# Patient Record
Sex: MALE | Race: WHITE | Employment: OTHER
[De-identification: names, ages, dates, MRNs, and addresses within clinical notes are randomized per-mention and may not be internally consistent; named-entity substitution may affect disease eponyms.]

---

## 2022-08-23 PROBLEM — B35.4 TINEA CORPORIS: Status: ACTIVE | Noted: 2017-10-05

## 2022-08-23 PROBLEM — M71.21 BAKER'S CYST OF KNEE, RIGHT: Status: ACTIVE | Noted: 2017-10-09

## 2022-08-23 PROBLEM — B37.2 CANDIDIASIS, INTERTRIGO: Status: ACTIVE | Noted: 2018-09-20

## 2022-08-23 PROBLEM — K42.9 UMBILICAL HERNIA WITHOUT OBSTRUCTION AND WITHOUT GANGRENE: Status: ACTIVE | Noted: 2018-11-06

## 2024-04-01 ENCOUNTER — HOSPITAL ENCOUNTER (OUTPATIENT)
Facility: HOSPITAL | Age: 79
Setting detail: RECURRING SERIES
Discharge: HOME | End: 2024-04-04
Payer: MEDICARE

## 2024-04-01 PROCEDURE — 97162 PT EVAL MOD COMPLEX 30 MIN: CPT

## 2024-04-01 PROCEDURE — 97110 THERAPEUTIC EXERCISES: CPT

## 2024-04-01 PROCEDURE — 97140 MANUAL THERAPY 1/> REGIONS: CPT

## 2024-04-01 NOTE — PROGRESS NOTES
[facility]  [address], [facility], [address] Phone: [phone] Fax [phone]  Plan of Care / Statement of Necessity for Physical Therapy Services     Patient Name: Michael Hernandez : 1945   Medical   Diagnosis: Other low back pain [M54.59] Treatment Diagnosis: M54.59  OTHER LOWER BACK PAIN     Onset Date: Worsening over past year Payor   Payor: MEDICARE / Plan: MEDICARE PART A AND B / Product Type: *No Product type* /    Referral Source: Ney Guevara DO Start of Care (SOC): 2024   Prior Hospitalization: See medical history Provider #: [id number]   Prior Level of Function: Functionally independent, lives with spouse and three dogs, retired practice manager, active   Comorbidities: allergies, arthritis, back pain, congestive heart failure/heart disease, high blood pressure, kidney stones, spasmodic torticollis      Assessment / key information:  Patient is a pleasant 78 year old Right handed male who presents with complaints of worsening low back pain that radiates into the Left buttocks. He reports that pain has been progressing over the past 1-1.5 years without specific injury. He has been diagnosed with levoscoliosis, and is awaiting MRI. Currently pain is worse with standing and walking, and relieved with sitting and supine. Patient is very active in garden, and is diligent about performing home stretches/HEP daily. At evaluation Patient presents with the following objective measures:    Objective Information/Functional Measures/Assessment:  Lumbar AROM: flexion WNL                          Extension WNL                          Rotation Right 50%, Left 50%                          Side bending Right WNL, Left WNL with pain increase     Knee strength: quads 4+/5, hamstrings 5/5 bilaterally      Hip strength: flexion Right 4+/5, Left 5/5                          Abduction Right 4/5, Left 4/5

## 2024-04-01 NOTE — PROGRESS NOTES
PHYSICAL / OCCUPATIONAL THERAPY - DAILY TREATMENT NOTE (updated )  For Eval visit    Patient Name: Michael Hernandez    Date: 2024    : 1945  Insurance: Payor: MEDICARE / Plan: MEDICARE PART A AND B / Product Type: *No Product type* /      Patient  verified yes     Visit #   Current / Total 1 24   Time   In / Out 9:03 9:46   Total Treatment  Time  43   Pain   In / Out 3/10 3/10   Subjective Functional Status/Changes: See below     NEXT PROGRESS NOTE DUE: 2024    TREATMENT AREA =  Other low back pain [M54.59]    SUBJECTIVE:  Patient is a pleasant 78 year old Right handed male who presents with complaints of worsening low back pain that radiates into the Left buttocks. He reports that pain has been progressing over the past 1-1.5 years without specific injury. He has been diagnosed with levoscoliosis, and is awaiting MRI. Currently pain is worse with standing and walking, and relieved with sitting and supine. Patient is very active in garden, and is diligent about performing home stretches/HEP daily.     Co-morbidities: allergies, arthritis, back pain, congestive heart failure/heart disease, high blood pressure, kidney stones    Allergies: pollen    OBJECTIVE    Modalities Rationale:     decrease inflammation, decrease pain, and increase tissue extensibility to improve patient's ability to progress to PLOF and address remaining functional goals.     min [] Estim Unattended, type/location:                                      []  w/ice    []  w/heat    min [] Estim Attended, type/location:                                     []  w/US     []  w/ice    []  w/heat    []  TENS insruct      min []  Mechanical Traction: type/lbs                   []  pro   []  sup   []  int   []  cont    []  before manual    []  after manual    min []  Ultrasound, settings/location:      min []  Iontophoresis w/ dexamethasone, location:                                               []  take home patch       []  in

## 2024-04-03 ENCOUNTER — HOSPITAL ENCOUNTER (OUTPATIENT)
Facility: HOSPITAL | Age: 79
Setting detail: RECURRING SERIES
Discharge: HOME | End: 2024-04-06
Payer: MEDICARE

## 2024-04-03 PROCEDURE — 97112 NEUROMUSCULAR REEDUCATION: CPT

## 2024-04-03 PROCEDURE — 97140 MANUAL THERAPY 1/> REGIONS: CPT

## 2024-04-03 PROCEDURE — 97530 THERAPEUTIC ACTIVITIES: CPT

## 2024-04-03 NOTE — PROGRESS NOTES
rotation AROM to at least 75% of full, without pain increase, in order to improve ease of ADLs.  Status at IE grossly 50% of full, pain with Left rotation   4.  Patient will be able to perform supine bridge to at least 75% of full in order to improve ease of bed mobility.  Status at IE 50%    PLAN  [x]  Continue plan of care  []  Upgrade activities as tolerated  []  Discharge due to :  []  Other:    Minnie Marin PT    4/3/2024    1:08 PM    Future Appointments   Date Time Provider Department Center   4/3/2024  3:20 PM Minnie Marin, PT [facility]   4/8/2024  9:40 AM Minnie Marin A, PT [facility]   4/17/2024  9:40 AM Minnie Marin, PT [facility]   4/22/2024 11:40 AM Minnie Marin A, PT [facility]   4/24/2024  9:40 AM Minnie Marin, PT [facility]   4/29/2024  9:40 AM Minnie Marin, PT [facility]   5/1/2024  9:40 AM Minnie Marin A, PT [facility]   5/6/2024  9:40 AM Minnie Marin A, PT [facility]   5/8/2024  9:40 AM Minnie Marin A, PT [facility]   6/6/2024  7:30 AM [facility] [place] Sched   6/13/2024  8:40 AM Petra Pearson, PACaroC [facility] [place] Sched

## 2024-04-08 ENCOUNTER — HOSPITAL ENCOUNTER (OUTPATIENT)
Facility: HOSPITAL | Age: 79
Setting detail: RECURRING SERIES
Discharge: HOME | End: 2024-04-11
Payer: MEDICARE

## 2024-04-08 PROCEDURE — 97140 MANUAL THERAPY 1/> REGIONS: CPT

## 2024-04-08 PROCEDURE — 97112 NEUROMUSCULAR REEDUCATION: CPT

## 2024-04-08 PROCEDURE — 97530 THERAPEUTIC ACTIVITIES: CPT

## 2024-04-08 NOTE — PROGRESS NOTES
progressing, has initiated 04/08/2024  2.  Patient will improve bilateral hip abduction and extension strength to 4+/5 in order to reduce lumbar strain with transfers and mobility.  Status at IE 4/5  3.  Patient will improve bilateral lumbar rotation AROM to at least 75% of full, without pain increase, in order to improve ease of ADLs.  Status at IE grossly 50% of full, pain with Left rotation   4.  Patient will be able to perform supine bridge to at least 75% of full in order to improve ease of bed mobility.  Status at IE 50%    PLAN  [x]  Continue plan of care  [x]  Upgrade activities as tolerated  []  Discharge due to :  []  Other:    Minnie Marin PT    4/8/2024    9:20 AM    Future Appointments   Date Time Provider Department Center   4/8/2024  9:40 AM Minnie Marin, PT [facility]   4/17/2024  9:40 AM Minnie Marin, PT [facility]   4/22/2024 11:40 AM Minnie Marin, PT [facility]   4/24/2024  9:40 AM Minnie Marin, PT [facility]   4/29/2024  9:40 AM Minnie Marin, PT [facility]   5/1/2024  9:40 AM Minnie Marin, PT [facility]   5/6/2024  9:40 AM Minnie Marin, PT [facility]   5/8/2024  9:40 AM Minnie Marin, PT [facility]   6/6/2024  7:30 AM [facility] [place] Sched   6/13/2024  8:40 AM Petra Pearson, ESSIE [facility] [place] Sched

## 2024-04-10 ENCOUNTER — HOSPITAL ENCOUNTER (OUTPATIENT)
Facility: HOSPITAL | Age: 79
Setting detail: RECURRING SERIES
Discharge: HOME | End: 2024-04-13
Payer: MEDICARE

## 2024-04-10 PROCEDURE — 97530 THERAPEUTIC ACTIVITIES: CPT

## 2024-04-10 PROCEDURE — 97140 MANUAL THERAPY 1/> REGIONS: CPT

## 2024-04-10 NOTE — PROGRESS NOTES
PHYSICAL / OCCUPATIONAL THERAPY - DAILY TREATMENT NOTE (updated )    Patient Name: Michael Hernandez    Date: 4/10/2024    : 1945  Insurance: Payor: MEDICARE / Plan: MEDICARE PART A AND B / Product Type: *No Product type* /      Patient  verified yes     Visit #   Current / Total 4 24   Time   In / Out 9:00 9:53    Total Treatment Time 53   Pain   In / Out 3-4/10 2/10   Subjective Functional Status/Changes: It's pretty much the same.      NEXT PROGRESS NOTE DUE: 2024    TREATMENT AREA =  Other low back pain [M54.59]    OBJECTIVE    Modalities Rationale:     decrease pain and increase tissue extensibility to improve patient's ability to progress to PLOF and address remaining functional goals.     min [] Estim Unattended, type/location:                                      []  w/ice    []  w/heat    min [] Estim Attended, type/location:                                     []  w/US     []  w/ice    []  w/heat    []  TENS insruct      min []  Mechanical Traction: type/lbs                   []  pro   []  sup   []  int   []  cont    []  before manual    []  after manual    min []  Ultrasound, settings/location:      min []  Iontophoresis w/ dexamethasone, location:                                               []  take home patch       []  in clinic   10 min  unbilled []  Ice     [x]  Heat    location/position: Right sidelying over half foam with pillow to Left lower back/hip    min []  Paraffin,  details:     min []  Vasopneumatic Device, press/temp:     min []  Whirlpool / Fluido:    If using vaso (only need to measure limb vaso being performed on)      pre-treatment girth :       post-treatment girth :       measured at (landmark location) :      min []  Other:    Skin assessment post-treatment (if applicable):    []  intact    []  redness- no adverse reaction                 []redness - adverse reaction:      Vasopnuematic compression justification:  Per bilateral girth measures taken and listed

## 2024-04-15 ENCOUNTER — HOSPITAL ENCOUNTER (OUTPATIENT)
Facility: HOSPITAL | Age: 79
Setting detail: RECURRING SERIES
Discharge: HOME | End: 2024-04-18
Payer: MEDICARE

## 2024-04-15 PROCEDURE — 97112 NEUROMUSCULAR REEDUCATION: CPT | Performed by: PHYSICAL THERAPIST

## 2024-04-15 PROCEDURE — 97530 THERAPEUTIC ACTIVITIES: CPT | Performed by: PHYSICAL THERAPIST

## 2024-04-15 NOTE — PROGRESS NOTES
justification:  Per bilateral girth measures taken and listed above the edema is considered significant and having an impact on the patient's strength, balance, gait, transfers, self care, and ADL's     Therapeutic Procedures:  Tx Min Billable or 1:1 Min (if diff from Tx Min) Procedure, Rationale, Specifics   10 0 [id number] Therapeutic Exercise (timed):  increase ROM, strength, coordination, balance, and proprioception to improve patient's ability to progress to PLOF and address remaining functional goals. (see flow sheet as applicable)     Details if applicable:    Bike  Incline stretch  Hamstring stretch       28 [phone] [id number] Therapeutic Activity (timed):  use of dynamic activities replicating functional movements to increase ROM, strength, coordination, balance, and proprioception in order to improve patient's ability to progress to PLOF and address remaining functional goals.  (see flow sheet as applicable)     Details if applicable:    Standing hip extension with knee flexion OTB  Standing clams with GTB  Bridge  Sit to stand with OTB   15 15 [id number] Neuromuscular Re-Education (timed):  improve balance, coordination, kinesthetic sense, posture, core stability and proprioception to improve patient's ability to develop conscious control of individual muscles and awareness of position of extremities in order to progress to PLOF and address remaining functional goals. (see flow sheet as applicable)     Details if applicable:    PPT   PPT with band/ball  BKFO  Seated PPT with march     33510 Manual Therapy (timed):  decrease pain, increase ROM, increase tissue extensibility, and decrease trigger points to improve patient's ability to progress to PLOF and address remaining functional goals.  The manual therapy interventions were performed at a separate and distinct time from the therapeutic activities interventions . (see flow sheet as applicable)     Details if applicable:    Prone STM/DTM to Left QL/glutes, PA mobs to

## 2024-04-17 ENCOUNTER — HOSPITAL ENCOUNTER (OUTPATIENT)
Facility: HOSPITAL | Age: 79
Setting detail: RECURRING SERIES
Discharge: HOME | End: 2024-04-20
Payer: MEDICARE

## 2024-04-17 PROCEDURE — 97112 NEUROMUSCULAR REEDUCATION: CPT

## 2024-04-17 PROCEDURE — 97110 THERAPEUTIC EXERCISES: CPT

## 2024-04-17 PROCEDURE — 97140 MANUAL THERAPY 1/> REGIONS: CPT

## 2024-04-17 NOTE — PROGRESS NOTES
PHYSICAL / OCCUPATIONAL THERAPY - DAILY TREATMENT NOTE (updated )    Patient Name: Michael Hernandez    Date: 2024    : 1945  Insurance: Payor: MEDICARE / Plan: MEDICARE PART A AND B / Product Type: *No Product type* /      Patient  verified yes     Visit #   Current / Total 6 24   Time   In / Out 9:40 10:26    Total Treatment Time 46   Pain   In / Out 4/10 2/10   Subjective Functional Status/Changes: It's still about the same.      NEXT PROGRESS NOTE DUE: 2024    TREATMENT AREA =  Other low back pain [M54.59]    OBJECTIVE    Modalities Rationale:     decrease pain and increase tissue extensibility to improve patient's ability to progress to PLOF and address remaining functional goals.     min [] Estim Unattended, type/location:                                      []  w/ice    []  w/heat    min [] Estim Attended, type/location:                                     []  w/US     []  w/ice    []  w/heat    []  TENS insruct      min []  Mechanical Traction: type/lbs                   []  pro   []  sup   []  int   []  cont    []  before manual    []  after manual    min []  Ultrasound, settings/location:      min []  Iontophoresis w/ dexamethasone, location:                                               []  take home patch       []  in clinic   PD min  unbilled []  Ice     []  Heat    location/position:     min []  Paraffin,  details:     min []  Vasopneumatic Device, press/temp:     min []  Whirlpool / Fluido:    If using vaso (only need to measure limb vaso being performed on)      pre-treatment girth :       post-treatment girth :       measured at (landmark location) :      min []  Other:    Skin assessment post-treatment (if applicable):    []  intact    []  redness- no adverse reaction                 []redness - adverse reaction:      Vasopnuematic compression justification:  Per bilateral girth measures taken and listed above the edema is considered significant and having an impact on the

## 2024-04-22 ENCOUNTER — HOSPITAL ENCOUNTER (OUTPATIENT)
Facility: HOSPITAL | Age: 79
Setting detail: RECURRING SERIES
Discharge: HOME | End: 2024-04-25
Payer: MEDICARE

## 2024-04-22 PROCEDURE — 97140 MANUAL THERAPY 1/> REGIONS: CPT

## 2024-04-22 PROCEDURE — 97112 NEUROMUSCULAR REEDUCATION: CPT

## 2024-04-22 PROCEDURE — 97530 THERAPEUTIC ACTIVITIES: CPT

## 2024-04-22 NOTE — PROGRESS NOTES
QL/lumbar paraspinals, manual Left QL stretch          Details if applicable:     51 45 [facility] Totals Reminder: bill using total billable min of TIMED therapeutic procedures (example: do not include dry needle or estim unattended, both untimed codes, in totals to left)  8-22 min = 1 unit; 23-37 min = 2 units; 38-52 min = 3 units; 53-67 min = 4 units; 68-82 min = 5 units   Total Total     [x]  Patient Education billed concurrently with other procedures   [x] Review HEP    [] Progressed/Changed HEP, detail:    [] Other detail:       Objective Information/Functional Measures/Assessment:  -Patient notes overall less back pain yesterday, but also admits to more periods of sitting during the day  -Bilateral hip abduction strength 4+/5, extension 4/5  -added wall sits on swiss ball for LE, posturing strengthening and balance; cueing to prevent anterior translation of knees over toes  -fatigue with standing clams, good form  -improving mobility noted with manual Left QL stretching in prone    Patient will continue to benefit from skilled PT / OT services to modify and progress therapeutic interventions, analyze and address functional mobility deficits, analyze and address ROM deficits, analyze and address strength deficits, analyze and address soft tissue restrictions, analyze and cue for proper movement patterns, analyze and modify for postural abnormalities, analyze and address imbalance/dizziness, and instruct in home and community integration to address functional deficits and attain remaining goals.    Progress toward goals / Updated goals:  []  See Progress Note/Recertification    Short Term Goals: To be accomplished in 10 treatments:   Patient will be independent and compliant with HEP in order to progress toward long term goals.  Status at  issued and reviewed  Current status: progressing, has initiated 04/08/2024  2.  Patient will improve bilateral hip abduction and extension strength to 4+/5 in order to reduce

## 2024-04-24 ENCOUNTER — HOSPITAL ENCOUNTER (OUTPATIENT)
Facility: HOSPITAL | Age: 79
Setting detail: RECURRING SERIES
Discharge: HOME | End: 2024-04-27
Payer: MEDICARE

## 2024-04-24 PROCEDURE — 97112 NEUROMUSCULAR REEDUCATION: CPT

## 2024-04-24 PROCEDURE — 97530 THERAPEUTIC ACTIVITIES: CPT

## 2024-04-24 PROCEDURE — 97140 MANUAL THERAPY 1/> REGIONS: CPT

## 2024-04-24 NOTE — PROGRESS NOTES
compliant with HEP in order to progress toward long term goals.  Status at IE issued and reviewed  Current status: progressing, has initiated 04/08/2024  2.  Patient will improve bilateral hip abduction and extension strength to 4+/5 in order to reduce lumbar strain with transfers and mobility.  Status at IE 4/5  Current status: progressing, abduction 4+/5 04/22/2024  3.  Patient will improve bilateral lumbar rotation AROM to at least 75% of full, without pain increase, in order to improve ease of ADLs.  Status at IE grossly 50% of full, pain with Left rotation   4.  Patient will be able to perform supine bridge to at least 75% of full in order to improve ease of bed mobility.  Status at IE 50%  Current status: progressing, 50% initially increasing to 75% 04/10/2024    PLAN  [x]  Continue plan of care  [x]  Upgrade activities as tolerated  []  Discharge due to :  []  Other:    Minnie Marin PT    4/24/2024    9:16 AM    Future Appointments   Date Time Provider Department Center   4/24/2024  9:40 AM [facility]   4/29/2024  9:40 AM Minnie Marin, PT [facility]   5/1/2024  9:40 AM [facility]   5/6/2024  9:40 AM [facility]   5/8/2024  9:40 AM Minnie Marin, PT [facility]   6/6/2024  7:30 AM [facility] [place] Sched   6/13/2024  8:40 AM Petra Pearson, ESSIE [facility] [place] Sched

## 2024-04-29 ENCOUNTER — HOSPITAL ENCOUNTER (OUTPATIENT)
Facility: HOSPITAL | Age: 79
Setting detail: RECURRING SERIES
Discharge: HOME | End: 2024-05-02
Payer: MEDICARE

## 2024-04-29 PROCEDURE — 97140 MANUAL THERAPY 1/> REGIONS: CPT

## 2024-04-29 PROCEDURE — 97530 THERAPEUTIC ACTIVITIES: CPT

## 2024-04-29 PROCEDURE — 97110 THERAPEUTIC EXERCISES: CPT

## 2024-04-29 NOTE — PROGRESS NOTES
[facility]  [address] [place] [address]  Phone: [phone]   [phone]  PHYSICAL THERAPY PROGRESS NOTE  Patient Name: Michael Hernandez : 1945   Treatment/Medical Diagnosis: Other low back pain [M54.59]   Referral Source: Ney Guevara DO     Date of Initial Visit: 2024 Attended Visits: 9 Missed Visits: 0     SUMMARY OF TREATMENT  Patient is a pleasant 78 year old male who has attended therapy over the past month for low back pain. Patient was diagnosed with levoscoliosis, and presents with Right LE longer than left; he was issued a heel lift for Left shoe at evaluation and notes no adverse effects. Currently Patient reports low back pain that radiates into the Left LE that is worse at the end of the day after activities. If he is able to support his trunk with a cart or cane he notes less pain. As pain increases, his trunk side bends to the Left, but if he is able to hold weight in his Right hand, he notes less bending and less pain. At reassessment today Patient stands with obvious scoliotic curvature to Left, and presents with hip weakness bilaterally, Right more than Left, that likely contributes to inability to maintain neutral spine with activities. Significant tenderness and tightness in Left quadratus lumborum and gluteal musculature. He had an MRI on 2024, and will follow up with DO next week. Patient will benefit from continued skilled physical therapy to address remaining deficits.     CURRENT STATUS  Hip strength: abduction Right 4-/5 Left 4/5, extension Right 4-/5 Left 4/5  Lumbar AROM: rotation 75% of full bilaterally, no pain increase  FOTO: 67/100 pts  Functional Gains: able to go longer with less pain after therapy sessions, able to walk with less pain when walking with cart at store or when using cane, less discomfort in low back when holding something heavy on Right  Functional Deficits: 
sheet as applicable)     Details if applicable:    Supine Left leg pull/distraction, Right sidelying over pillow - Manual Left QL stretching, STM/DTM to Left QL           Details if applicable:     41 39 [facility] Totals Reminder: bill using total billable min of TIMED therapeutic procedures (example: do not include dry needle or estim unattended, both untimed codes, in totals to left)  8-22 min = 1 unit; 23-37 min = 2 units; 38-52 min = 3 units; 53-67 min = 4 units; 68-82 min = 5 units   Total Total     [x]  Patient Education billed concurrently with other procedures   [x] Review HEP    [] Progressed/Changed HEP, detail:    [] Other detail:       Objective Information/Functional Measures/Assessment:  Hip strength: abduction Right 4-/5 Left 4/5, extension Right 4-/5 Left 4/5  Lumbar AROM: rotation 75% of full bilaterally, no pain increase  FOTO: 67/100 pts  Functional Gains: able to go longer with less pain after therapy sessions, able to walk with less pain when walking with cart at store or when using cane, less discomfort in low back when holding something heavy on Right  Functional Deficits: pain with activity/pain worsening as day progresses, increasing Left trunk side bending with standing/walking during day, radicular symptoms in Left LE worsening throughout day, tightness in low back at end of day (relieved with lying supine)  % improvement: 40%  Pain   Average: 4-5/10       Best: 0/10 (supine or sitting)     Worst: 7/10  Patient Goal: \"to find out what's going on\"     Assessment: Patient is a pleasant 78 year old male who has attended therapy over the past month for low back pain. Patient was diagnosed with levoscoliosis, and presents with Right LE longer than left; he was issued a heel lift for Left shoe at evaluation and notes no adverse effects. Currently Patient reports low back pain that radiates into the Left LE that is worse at the end of the day after activities. If he is able to support his trunk with

## 2024-05-01 ENCOUNTER — HOSPITAL ENCOUNTER (OUTPATIENT)
Facility: HOSPITAL | Age: 79
Setting detail: RECURRING SERIES
Discharge: HOME | End: 2024-05-04
Payer: MEDICARE

## 2024-05-01 PROCEDURE — 97530 THERAPEUTIC ACTIVITIES: CPT

## 2024-05-01 PROCEDURE — 97110 THERAPEUTIC EXERCISES: CPT

## 2024-05-01 PROCEDURE — 97140 MANUAL THERAPY 1/> REGIONS: CPT

## 2024-05-01 NOTE — PROGRESS NOTES
functional deficits and attain remaining goals.    Progress toward goals / Updated goals:  []  See Progress Note/Recertification    LONG-TERM GOALS TO BE ACHIEVED IN 15 remaining treatments    Patient will improve FOTO assessment score to 66/100 pts in order to indicate improved functional abilities.   Status at IE 61/100 pts  2.  Patient will improve bilateral knee extension and hip strength to 5/5 throughout in order to reduce low back strain with transfers and mobility.   Status at IE knee extension 4+/5 bilaterally, flexion Right 4+/5, Left 5/5; Abduction Right 4/5, Left 4/5; Extension Right 4/5, Left 4/5 with pain; External rotation Right 5/5, Left 5/5; Internal rotation Right 4+/5, Left 5/5  3.  Patient will improve supine bridge to full ROM in order to indicate improved gluteal engagement with mobility.   Status at IE 50% bridge  4.  Patient will report worst low back, Left buttocks pain as 2/10 or less in order to improve standing and walking tolerance.   Status at IE 5/10    PLAN  [x]  Continue plan of care  []  Upgrade activities as tolerated  []  Discharge due to :  []  Other:    Minnie Marin PT    5/1/2024    8:06 AM    Future Appointments   Date Time Provider Department Center   5/1/2024  8:20 AM [facility]   5/6/2024  9:40 AM Minnie Marin, PT [facility]   5/8/2024  9:40 AM Minnie Marin, PT [facility]   5/13/2024  9:00 AM Minnie Marin, PT [facility]   5/20/2024 10:20 AM Minnie Marin, PT [facility]   5/22/2024  9:40 AM Minnie Marin, PT [facility]   5/29/2024  9:40 AM Minnie Marin, PT [facility]   6/6/2024  7:30 AM [facility] [place] Sched   6/13/2024  8:40 AM Petra Pearson, PA-C [facility] [place] Sched

## 2024-05-06 ENCOUNTER — HOSPITAL ENCOUNTER (OUTPATIENT)
Facility: HOSPITAL | Age: 79
Setting detail: RECURRING SERIES
Discharge: HOME | End: 2024-05-09
Payer: MEDICARE

## 2024-05-06 PROCEDURE — 97140 MANUAL THERAPY 1/> REGIONS: CPT

## 2024-05-06 PROCEDURE — 97110 THERAPEUTIC EXERCISES: CPT

## 2024-05-06 PROCEDURE — 97530 THERAPEUTIC ACTIVITIES: CPT

## 2024-05-06 NOTE — PROGRESS NOTES
goals.  The manual therapy interventions were performed at a separate and distinct time from the therapeutic activities interventions . (see flow sheet as applicable)     Details if applicable:    Right sidelying over pillow - Manual Left QL stretching, STM/DTM to Left QL, piriformis/gluts           Details if applicable:     45 45 MC BC Totals Reminder: bill using total billable min of TIMED therapeutic procedures (example: do not include dry needle or estim unattended, both untimed codes, in totals to left)  8-22 min = 1 unit; 23-37 min = 2 units; 38-52 min = 3 units; 53-67 min = 4 units; 68-82 min = 5 units   Total Total     [x]  Patient Education billed concurrently with other procedures   [x] Review HEP    [] Progressed/Changed HEP, detail:    [] Other detail:       Objective Information/Functional Measures/Assessment:  -Focused on hip strengthening due to Right hip weakness leading to compensations  -patient notes overall continued pain, up to 5/10, at end of day/after activities  -MD follow up tomorrow to review recent MRI; review results at next visit    Patient will continue to benefit from skilled PT / OT services to modify and progress therapeutic interventions, analyze and address functional mobility deficits, analyze and address ROM deficits, analyze and address strength deficits, analyze and address soft tissue restrictions, analyze and cue for proper movement patterns, analyze and modify for postural abnormalities, analyze and address imbalance/dizziness, and instruct in home and community integration to address functional deficits and attain remaining goals.    Progress toward goals / Updated goals:  []  See Progress Note/Recertification    LONG-TERM GOALS TO BE ACHIEVED IN 15 remaining treatments    Patient will improve FOTO assessment score to 66/100 pts in order to indicate improved functional abilities.   Status at IE 61/100 pts  Current status:  2.  Patient will improve bilateral knee extension

## 2024-05-08 ENCOUNTER — HOSPITAL ENCOUNTER (OUTPATIENT)
Facility: HOSPITAL | Age: 79
Setting detail: RECURRING SERIES
Discharge: HOME | End: 2024-05-11
Payer: MEDICARE

## 2024-05-08 PROCEDURE — 97140 MANUAL THERAPY 1/> REGIONS: CPT

## 2024-05-08 PROCEDURE — 97530 THERAPEUTIC ACTIVITIES: CPT

## 2024-05-08 PROCEDURE — 97110 THERAPEUTIC EXERCISES: CPT

## 2024-05-08 NOTE — PROGRESS NOTES
PHYSICAL / OCCUPATIONAL THERAPY - DAILY TREATMENT NOTE (updated )    Patient Name: Michael Hernandez    Date: 2024    : 1945  Insurance: Payor: MEDICARE / Plan: MEDICARE PART A AND B / Product Type: *No Product type* /      Patient  verified yes     Visit #   Current / Total 12 24   Time   In / Out 9:40 10:21    Total Treatment Time 41   Pain   In / Out 3.5/10 2/10   Subjective Functional Status/Changes: I'm going for an epidural this afternoon.      NEXT PROGRESS NOTE DUE: 2024    TREATMENT AREA =  Other low back pain [M54.59]    OBJECTIVE    Modalities Rationale:     decrease pain and increase tissue extensibility to improve patient's ability to progress to PLOF and address remaining functional goals.     min [] Estim Unattended, type/location:                                      []  w/ice    []  w/heat    min [] Estim Attended, type/location:                                     []  w/US     []  w/ice    []  w/heat    []  TENS insruct      min []  Mechanical Traction: type/lbs                   []  pro   []  sup   []  int   []  cont    []  before manual    []  after manual    min []  Ultrasound, settings/location:      min []  Iontophoresis w/ dexamethasone, location:                                               []  take home patch       []  in clinic   PD min  unbilled []  Ice     []  Heat    location/position:     min []  Paraffin,  details:     min []  Vasopneumatic Device, press/temp:     min []  Whirlpool / Fluido:    If using vaso (only need to measure limb vaso being performed on)      pre-treatment girth :       post-treatment girth :       measured at (landmark location) :      min []  Other:    Skin assessment post-treatment (if applicable):    []  intact    []  redness- no adverse reaction                 []redness - adverse reaction:      Vasopnuematic compression justification:  Per bilateral girth measures taken and listed above the edema is considered significant and having

## 2024-05-13 ENCOUNTER — HOSPITAL ENCOUNTER (OUTPATIENT)
Facility: HOSPITAL | Age: 79
Setting detail: RECURRING SERIES
Discharge: HOME | End: 2024-05-16
Payer: MEDICARE

## 2024-05-13 PROCEDURE — 97140 MANUAL THERAPY 1/> REGIONS: CPT

## 2024-05-13 PROCEDURE — 97530 THERAPEUTIC ACTIVITIES: CPT

## 2024-05-13 PROCEDURE — 97110 THERAPEUTIC EXERCISES: CPT

## 2024-05-13 NOTE — PROGRESS NOTES
Details if applicable:    Right sidelying over pillow - Manual Left QL stretching, STM/DTM to Left QL, piriformis/gluts           Details if applicable:     42 42 MC BC Totals Reminder: bill using total billable min of TIMED therapeutic procedures (example: do not include dry needle or estim unattended, both untimed codes, in totals to left)  8-22 min = 1 unit; 23-37 min = 2 units; 38-52 min = 3 units; 53-67 min = 4 units; 68-82 min = 5 units   Total Total     [x]  Patient Education billed concurrently with other procedures   [x] Review HEP    [] Progressed/Changed HEP, detail:    [] Other detail:       Objective Information/Functional Measures/Assessment:  -Patient received injections last week following PT session; he states that injections contained dexamethasone, and since injections he does note less Left sciatic pain  -added Total Gym for LE strengthening to support spine  -remains tender to pressure at Left QL origin during manual     Patient will continue to benefit from skilled PT / OT services to modify and progress therapeutic interventions, analyze and address functional mobility deficits, analyze and address ROM deficits, analyze and address strength deficits, analyze and address soft tissue restrictions, analyze and cue for proper movement patterns, analyze and modify for postural abnormalities, analyze and address imbalance/dizziness, and instruct in home and community integration to address functional deficits and attain remaining goals.    Progress toward goals / Updated goals:  []  See Progress Note/Recertification    LONG-TERM GOALS TO BE ACHIEVED IN 15 remaining treatments    Patient will improve FOTO assessment score to 66/100 pts in order to indicate improved functional abilities.   Status at IE 61/100 pts  Current status:  2.  Patient will improve bilateral knee extension and hip strength to 5/5 throughout in order to reduce low back strain with transfers and mobility.   Status at IE knee

## 2024-05-15 ENCOUNTER — HOSPITAL ENCOUNTER (OUTPATIENT)
Facility: HOSPITAL | Age: 79
Setting detail: RECURRING SERIES
Discharge: HOME | End: 2024-05-18
Payer: MEDICARE

## 2024-05-15 PROCEDURE — 97530 THERAPEUTIC ACTIVITIES: CPT

## 2024-05-15 PROCEDURE — 97140 MANUAL THERAPY 1/> REGIONS: CPT

## 2024-05-15 PROCEDURE — 97110 THERAPEUTIC EXERCISES: CPT

## 2024-05-15 NOTE — PROGRESS NOTES
PHYSICAL / OCCUPATIONAL THERAPY - DAILY TREATMENT NOTE (updated )    Patient Name: Michael Hernandez    Date: 5/15/2024    : 1945  Insurance: Payor: MEDICARE / Plan: MEDICARE PART A AND B / Product Type: *No Product type* /      Patient  verified yes     Visit #   Current / Total 14 24   Time   In / Out 8:57 9:43   Total Treatment Time 46   Pain   In / Out 3/10 2/10   Subjective Functional Status/Changes: The think that the injections helped with the sciatica. But with the muscle discomfort not so much.      NEXT PROGRESS NOTE DUE: 2024    TREATMENT AREA =  Other low back pain [M54.59]    OBJECTIVE    Modalities Rationale:     decrease pain and increase tissue extensibility to improve patient's ability to progress to PLOF and address remaining functional goals.     min [] Estim Unattended, type/location:                                      []  w/ice    []  w/heat    min [] Estim Attended, type/location:                                     []  w/US     []  w/ice    []  w/heat    []  TENS insruct      min []  Mechanical Traction: type/lbs                   []  pro   []  sup   []  int   []  cont    []  before manual    []  after manual    min []  Ultrasound, settings/location:      min []  Iontophoresis w/ dexamethasone, location:                                               []  take home patch       []  in clinic   PD min  unbilled []  Ice     []  Heat    location/position:     min []  Paraffin,  details:     min []  Vasopneumatic Device, press/temp:     min []  Whirlpool / Fluido:    If using vaso (only need to measure limb vaso being performed on)      pre-treatment girth :       post-treatment girth :       measured at (landmark location) :      min []  Other:    Skin assessment post-treatment (if applicable):    []  intact    []  redness- no adverse reaction                 []redness - adverse reaction:      Vasopnuematic compression justification:  Per bilateral girth measures taken and

## 2024-05-20 ENCOUNTER — HOSPITAL ENCOUNTER (OUTPATIENT)
Facility: HOSPITAL | Age: 79
Setting detail: RECURRING SERIES
Discharge: HOME | End: 2024-05-23
Payer: MEDICARE

## 2024-05-20 PROCEDURE — 97530 THERAPEUTIC ACTIVITIES: CPT

## 2024-05-20 PROCEDURE — 97140 MANUAL THERAPY 1/> REGIONS: CPT

## 2024-05-20 NOTE — PROGRESS NOTES
PHYSICAL / OCCUPATIONAL THERAPY - DAILY TREATMENT NOTE (updated )    Patient Name: Michael Hernandez    Date: 2024    : 1945  Insurance: Payor: MEDICARE / Plan: MEDICARE PART A AND B / Product Type: *No Product type* /      Patient  verified yes     Visit #   Current / Total 15 24   Time   In / Out 10:20 11:05    Total Treatment Time 45   Pain   In / Out 3/10 1/10   Subjective Functional Status/Changes: The doctor switched me from Gabapentin to Lyrica and that seems to be tolerable. But I don't know what's the medicine versus the exercises versus the injections.      NEXT PROGRESS NOTE DUE: 2024    TREATMENT AREA =  Other low back pain [M54.59]    OBJECTIVE    Modalities Rationale:     decrease pain and increase tissue extensibility to improve patient's ability to progress to PLOF and address remaining functional goals.     min [] Estim Unattended, type/location:                                      []  w/ice    []  w/heat    min [] Estim Attended, type/location:                                     []  w/US     []  w/ice    []  w/heat    []  TENS insruct      min []  Mechanical Traction: type/lbs                   []  pro   []  sup   []  int   []  cont    []  before manual    []  after manual    min []  Ultrasound, settings/location:      min []  Iontophoresis w/ dexamethasone, location:                                               []  take home patch       []  in clinic   PD min  unbilled []  Ice     []  Heat    location/position:     min []  Paraffin,  details:     min []  Vasopneumatic Device, press/temp:     min []  Whirlpool / Fluido:    If using vaso (only need to measure limb vaso being performed on)      pre-treatment girth :       post-treatment girth :       measured at (landmark location) :      min []  Other:    Skin assessment post-treatment (if applicable):    []  intact    []  redness- no adverse reaction                 []redness - adverse reaction:      Vasopnuematic

## 2024-05-22 ENCOUNTER — HOSPITAL ENCOUNTER (OUTPATIENT)
Facility: HOSPITAL | Age: 79
Setting detail: RECURRING SERIES
Discharge: HOME | End: 2024-05-25
Payer: MEDICARE

## 2024-05-22 PROCEDURE — 97530 THERAPEUTIC ACTIVITIES: CPT

## 2024-05-22 PROCEDURE — 97140 MANUAL THERAPY 1/> REGIONS: CPT

## 2024-05-22 PROCEDURE — 97112 NEUROMUSCULAR REEDUCATION: CPT

## 2024-05-22 NOTE — PROGRESS NOTES
indicate improved functional abilities.   Status at IE 61/100 pts  Current status:  2.  Patient will improve bilateral knee extension and hip strength to 5/5 throughout in order to reduce low back strain with transfers and mobility.   Status at IE knee extension 4+/5 bilaterally, flexion Right 4+/5, Left 5/5; Abduction Right 4/5, Left 4/5; Extension Right 4/5, Left 4/5 with pain; External rotation Right 5/5, Left 5/5; Internal rotation Right 4+/5, Left 5/5  Current status: progressing, knee extension Right 5/5 Left 5/5; hip flexion Right 5/5 Left 4+/5 05/20/2024  3.  Patient will improve supine bridge to full ROM in order to indicate improved gluteal engagement with mobility.   Status at IE 50% bridge  Current status: met today 05/08/2024  4.  Patient will report worst low back, Left buttocks pain as 2/10 or less in order to improve standing and walking tolerance.   Status at IE 5/10  Current status: progressing, 4-5/10 since injections 05/13/2024    PLAN  [x]  Continue plan of care  []  Upgrade activities as tolerated  []  Discharge due to :  []  Other:    Minnie Marin PT    5/22/2024    8:05 AM    Future Appointments   Date Time Provider Department Center   5/22/2024  9:40 AM Minnie Marin, PT [facility]   5/29/2024  9:40 AM Minnie Marin, PT [facility]   6/3/2024 11:00 AM Minnie Marin, PT [facility]   6/5/2024  9:00 AM Minnie Marin, PT [facility]   6/6/2024  7:30 AM [facility] Sched   6/10/2024  9:00 AM Minnie Marin, PT [facility]   6/12/2024  9:00 AM Minnie Marin, PT [facility]   6/13/2024  8:40 AM Petra Pearson PA-C [facility] Sched   6/17/2024  9:00 AM Minnie Marin, PT [facility]   6/19/2024  9:00 AM Minnie Marin, PT [facility]

## 2024-05-29 ENCOUNTER — HOSPITAL ENCOUNTER (OUTPATIENT)
Facility: HOSPITAL | Age: 79
Setting detail: RECURRING SERIES
Discharge: HOME | End: 2024-06-01
Payer: MEDICARE

## 2024-05-29 PROCEDURE — 97140 MANUAL THERAPY 1/> REGIONS: CPT

## 2024-05-29 PROCEDURE — 97530 THERAPEUTIC ACTIVITIES: CPT

## 2024-05-29 NOTE — PROGRESS NOTES
[facility]  [address] [place] [address]  Phone: [phone]   [phone]  PHYSICAL THERAPY PROGRESS NOTE  Patient Name: Michael Hernandez : 1945   Treatment/Medical Diagnosis: Other low back pain [M54.59]   Referral Source: Ney Guevara DO     Date of Initial Visit: 2024 Attended Visits: 17 Missed Visits: 0     SUMMARY OF TREATMENT  Patient has consistently attended therapy for low back and Left LE pain over the past two months. During episode we have focused on strengthening the LE and core to improve postural stability (history of levoscoliosis), as well as manual interventions to release Left QL and gluts. Patient notes decreased pain levels following therapy interventions, and with compliance of HEP at home. His worst pain is in the evenings after being active during the day, and he notes worsening curvature of spine as the day wears on. At reassessment today he demonstrates improving LE strength but continues to have palpable tension/tenderness in Left QL and gluts. Patient follows up with DO in two days; plan to continue with PT for another month to progress strength as well as to initiate dry needling to reduce soft tissue restrictions.     CURRENT STATUS  LE strength: knee extension Right 5/5 Left 5/5; hip: flexion Right 5/5 Left 5/5, abduction Right 4+/5 Left 5/5, extension Right 4+/5 Left 4+/5, external rotation Right 5/5 Left 4+/5, internal rotation Right 4+/5 Left 5/5  FOTO: 59/100 pts  Oswestry: 14/50 pts  Functional Gains: worsening of pain levels starts later in the day, less pain after exercises, no pain with supine/sitting, sleep, less stiffness in the mornings, knowledge of proper exercise routine  Functional Deficits: pain levels worsening in back and Left leg in the evenings after activities, now taking Lyrica    % improvement: 50% at least   Pain   Average: 3/10                  Best: 0/10

## 2024-05-29 NOTE — PROGRESS NOTES
PHYSICAL / OCCUPATIONAL THERAPY - DAILY TREATMENT NOTE (updated )    Patient Name: Michael Hernandez    Date: 2024    : 1945  Insurance: Payor: MEDICARE / Plan: MEDICARE PART A AND B / Product Type: *No Product type* /      Patient  verified yes     Visit #   Current / Total 17 24   Time   In / Out 9:40 10:24   Total Treatment Time 44   Pain   In / Out 3/10 2/10   Subjective Functional Status/Changes: I don't think that the injections had much impact.      NEXT PROGRESS NOTE DUE: 2024    TREATMENT AREA =  Other low back pain [M54.59]    OBJECTIVE    Modalities Rationale:     decrease pain and increase tissue extensibility to improve patient's ability to progress to PLOF and address remaining functional goals.     min [] Estim Unattended, type/location:                                      []  w/ice    []  w/heat    min [] Estim Attended, type/location:                                     []  w/US     []  w/ice    []  w/heat    []  TENS insruct      min []  Mechanical Traction: type/lbs                   []  pro   []  sup   []  int   []  cont    []  before manual    []  after manual    min []  Ultrasound, settings/location:      min []  Iontophoresis w/ dexamethasone, location:                                               []  take home patch       []  in clinic   PD min  unbilled []  Ice     []  Heat    location/position:     min []  Paraffin,  details:     min []  Vasopneumatic Device, press/temp:     min []  Whirlpool / Fluido:    If using vaso (only need to measure limb vaso being performed on)      pre-treatment girth :       post-treatment girth :       measured at (landmark location) :      min []  Other:    Skin assessment post-treatment (if applicable):    []  intact    []  redness- no adverse reaction                 []redness - adverse reaction:      Vasopnuematic compression justification:  Per bilateral girth measures taken and listed above the edema is considered significant and

## 2024-06-03 ENCOUNTER — HOSPITAL ENCOUNTER (OUTPATIENT)
Facility: HOSPITAL | Age: 79
Setting detail: RECURRING SERIES
Discharge: HOME | End: 2024-06-06
Payer: MEDICARE

## 2024-06-03 PROCEDURE — 97140 MANUAL THERAPY 1/> REGIONS: CPT

## 2024-06-03 PROCEDURE — 97110 THERAPEUTIC EXERCISES: CPT

## 2024-06-03 PROCEDURE — 20560 NDL INSJ W/O NJX 1 OR 2 MUSC: CPT

## 2024-06-03 NOTE — PROGRESS NOTES
PHYSICAL / OCCUPATIONAL THERAPY - DAILY TREATMENT NOTE (updated )    Patient Name: Michael Hernandez    Date: 6/3/2024    : 1945  Insurance: Payor: MEDICARE / Plan: MEDICARE PART A AND B / Product Type: *No Product type* /      Patient  verified yes     Visit #   Current / Total 18 24   Time   In / Out 11:00 11:48    Total Treatment Time  48   Pain   In / Out 3/10 3/10   Subjective Functional Status/Changes: I'm scheduled for another procedure on the , I think a facet joint injection.      NEXT PROGRESS NOTE DUE: 2024    TREATMENT AREA =  Other low back pain [M54.59]    OBJECTIVE    Modalities Rationale:     decrease pain and increase tissue extensibility to improve patient's ability to progress to PLOF and address remaining functional goals.     min [] Estim Unattended, type/location:                                      []  w/ice    []  w/heat    min [] Estim Attended, type/location:                                     []  w/US     []  w/ice    []  w/heat    []  TENS insruct      min []  Mechanical Traction: type/lbs                   []  pro   []  sup   []  int   []  cont    []  before manual    []  after manual    min []  Ultrasound, settings/location:      min []  Iontophoresis w/ dexamethasone, location:                                               []  take home patch       []  in clinic   PD min  unbilled []  Ice     []  Heat    location/position:     min []  Paraffin,  details:     min []  Vasopneumatic Device, press/temp:     min []  Whirlpool / Fluido:    If using vaso (only need to measure limb vaso being performed on)      pre-treatment girth :       post-treatment girth :       measured at (landmark location) :      min []  Other:    Skin assessment post-treatment (if applicable):    []  intact    []  redness- no adverse reaction                 []redness - adverse reaction:      Vasopnuematic compression justification:  Per bilateral girth measures taken and listed above the

## 2024-06-05 ENCOUNTER — HOSPITAL ENCOUNTER (OUTPATIENT)
Facility: HOSPITAL | Age: 79
Setting detail: RECURRING SERIES
Discharge: HOME | End: 2024-06-08
Payer: MEDICARE

## 2024-06-05 PROCEDURE — 97112 NEUROMUSCULAR REEDUCATION: CPT

## 2024-06-05 PROCEDURE — 97110 THERAPEUTIC EXERCISES: CPT

## 2024-06-05 PROCEDURE — 97140 MANUAL THERAPY 1/> REGIONS: CPT

## 2024-06-05 PROCEDURE — 20560 NDL INSJ W/O NJX 1 OR 2 MUSC: CPT

## 2024-06-05 NOTE — PROGRESS NOTES
analyze and address ROM deficits, analyze and address strength deficits, analyze and address soft tissue restrictions, analyze and cue for proper movement patterns, analyze and modify for postural abnormalities, analyze and address imbalance/dizziness, and instruct in home and community integration to address functional deficits and attain remaining goals.    Progress toward goals / Updated goals:  []  See Progress Note/Recertification    LONG-TERM GOALS TO BE ACHIEVED IN 7 treatments   Patient will improve Oswestry score to 4/50 pts or less in order to indicate improved functional abilities.   Status at PN (05/29/2024): 14/50 pts     2.  Patient will improve bilateral knee extension and hip strength to 5/5 throughout in order to reduce low back strain with transfers and mobility.   Status at IE knee extension 4+/5 bilaterally, flexion Right 4+/5, Left 5/5; Abduction Right 4/5, Left 4/5; Extension Right 4/5, Left 4/5 with pain; External rotation Right 5/5, Left 5/5; Internal rotation Right 4+/5, Left 5/5  Status at PN (05/29/2024): knee extension Right 5/5 Left 5/5; hip: flexion Right 5/5 Left 5/5, abduction Right 4+/5 Left 5/5, extension Right 4+/5 Left 4+/5, external rotation Right 5/5 Left 4+/5, internal rotation Right 4+/5 Left 5/5   Current status: Progressing, extension Right 4+/5 Left 5/5 06/05/2024     3.  Patient will report worst low back, Left buttocks pain as 2/10 or less in order to improve standing and walking tolerance.   Status at IE 5/10  Status at  (05/29/2024): remains 5/10    PLAN  yes Continue plan of care  []  Upgrade activities as tolerated  []  Discharge due to :  []  Other:    Minnie Marin PT    6/5/2024    8:02 AM    Future Appointments   Date Time Provider Department Center   6/5/2024  9:00 AM Minnie Marin PT [facility]   6/10/2024  9:00 AM Minnie Marin PT [facility]   6/12/2024  9:00 AM Minnie Marin PT [facility]   6/13/2024  8:40 AM Petra Pearson, [facility]

## 2024-06-10 ENCOUNTER — HOSPITAL ENCOUNTER (OUTPATIENT)
Facility: HOSPITAL | Age: 79
Setting detail: RECURRING SERIES
Discharge: HOME | End: 2024-06-13
Payer: MEDICARE

## 2024-06-10 PROCEDURE — 97112 NEUROMUSCULAR REEDUCATION: CPT

## 2024-06-10 PROCEDURE — 97140 MANUAL THERAPY 1/> REGIONS: CPT

## 2024-06-10 PROCEDURE — 97110 THERAPEUTIC EXERCISES: CPT

## 2024-06-10 NOTE — PROGRESS NOTES
in order to indicate improved functional abilities.   Status at PN (05/29/2024): 14/50 pts     2.  Patient will improve bilateral knee extension and hip strength to 5/5 throughout in order to reduce low back strain with transfers and mobility.   Status at IE knee extension 4+/5 bilaterally, flexion Right 4+/5, Left 5/5; Abduction Right 4/5, Left 4/5; Extension Right 4/5, Left 4/5 with pain; External rotation Right 5/5, Left 5/5; Internal rotation Right 4+/5, Left 5/5  Status at PN (05/29/2024): knee extension Right 5/5 Left 5/5; hip: flexion Right 5/5 Left 5/5, abduction Right 4+/5 Left 5/5, extension Right 4+/5 Left 4+/5, external rotation Right 5/5 Left 4+/5, internal rotation Right 4+/5 Left 5/5   Current status: Progressing, extension Right 4+/5 Left 5/5 06/05/2024     3.  Patient will report worst low back, Left buttocks pain as 2/10 or less in order to improve standing and walking tolerance.   Status at IE 5/10  Status at PN (05/29/2024): remains 5/10    PLAN  yes Continue plan of care  []  Upgrade activities as tolerated  []  Discharge due to :  []  Other:    Minnie Marin PT    6/10/2024    8:20 AM    Future Appointments   Date Time Provider Department Center   6/10/2024  9:00 AM Minnie Marin PT [facility]   6/12/2024  9:00 AM Minnie Marin PT [facility]   6/13/2024  8:40 AM Petra Pearson PA-C [facility]   6/17/2024 11:00 AM Minnie Marin PT [facility]   6/24/2024  2:40 PM Minnie Marin PT [facility]   6/26/2024  9:40 AM Minnie Mairn PT [facility]

## 2024-06-12 ENCOUNTER — HOSPITAL ENCOUNTER (OUTPATIENT)
Facility: HOSPITAL | Age: 79
Setting detail: RECURRING SERIES
Discharge: HOME | End: 2024-06-15
Payer: MEDICARE

## 2024-06-12 PROCEDURE — 97140 MANUAL THERAPY 1/> REGIONS: CPT

## 2024-06-12 PROCEDURE — 97110 THERAPEUTIC EXERCISES: CPT

## 2024-06-12 PROCEDURE — 97112 NEUROMUSCULAR REEDUCATION: CPT

## 2024-06-12 NOTE — PROGRESS NOTES
(example: do not include dry needle or estim unattended, both untimed codes, in totals to left)  8-22 min = 1 unit; 23-37 min = 2 units; 38-52 min = 3 units; 53-67 min = 4 units; 68-82 min = 5 units   Total Total     [x]  Patient Education billed concurrently with other procedures   [x] Review HEP    [] Progressed/Changed HEP, detail:    [] Other detail:       Objective Information/Functional Measures/Assessment:  -Patient reports improvements in pain levels following therapy sessions, but states that at the end of the day he continues to have increasing flexion/Left side bending posture  -performed manual interventions in prone today- improved upright posture following session; encouraged Patient to perform prone lying at least once a day as able  -plan to place on 30 day hold/ discharge at next PN    Patient will continue to benefit from skilled PT / OT services to modify and progress therapeutic interventions, analyze and address functional mobility deficits, analyze and address ROM deficits, analyze and address strength deficits, analyze and address soft tissue restrictions, analyze and cue for proper movement patterns, analyze and modify for postural abnormalities, analyze and address imbalance/dizziness, and instruct in home and community integration to address functional deficits and attain remaining goals.    Progress toward goals / Updated goals:  []  See Progress Note/Recertification    LONG-TERM GOALS TO BE ACHIEVED IN 7 treatments   Patient will improve Oswestry score to 4/50 pts or less in order to indicate improved functional abilities.   Status at PN (05/29/2024): 14/50 pts     2.  Patient will improve bilateral knee extension and hip strength to 5/5 throughout in order to reduce low back strain with transfers and mobility.   Status at IE knee extension 4+/5 bilaterally, flexion Right 4+/5, Left 5/5; Abduction Right 4/5, Left 4/5; Extension Right 4/5, Left 4/5 with pain; External rotation Right 5/5,

## 2024-06-17 ENCOUNTER — HOSPITAL ENCOUNTER (OUTPATIENT)
Facility: HOSPITAL | Age: 79
Setting detail: RECURRING SERIES
Discharge: HOME | End: 2024-06-20
Payer: MEDICARE

## 2024-06-17 PROCEDURE — 97112 NEUROMUSCULAR REEDUCATION: CPT

## 2024-06-17 PROCEDURE — 97140 MANUAL THERAPY 1/> REGIONS: CPT

## 2024-06-17 PROCEDURE — 97110 THERAPEUTIC EXERCISES: CPT

## 2024-06-17 NOTE — PROGRESS NOTES
PHYSICAL / OCCUPATIONAL THERAPY - DAILY TREATMENT NOTE (updated )    Patient Name: Michael Hernandez    Date: 2024    : 1945  Insurance: Payor: MEDICARE / Plan: MEDICARE PART A AND B / Product Type: *No Product type* /      Patient  verified yes     Visit #   Current / Total 22 24   Time   In / Out 11:00 11:46    Total Treatment Time 46   Pain   In / Out 3/10 2/10   Subjective Functional Status/Changes: I see the doctor on Wednesday for another shot.      NEXT PROGRESS NOTE DUE: 2024    TREATMENT AREA =  Other low back pain [M54.59]    OBJECTIVE    Modalities Rationale:     decrease pain and increase tissue extensibility to improve patient's ability to progress to PLOF and address remaining functional goals.     min [] Estim Unattended, type/location:                                      []  w/ice    []  w/heat    min [] Estim Attended, type/location:                                     []  w/US     []  w/ice    []  w/heat    []  TENS insruct      min []  Mechanical Traction: type/lbs                   []  pro   []  sup   []  int   []  cont    []  before manual    []  after manual    min []  Ultrasound, settings/location:      min []  Iontophoresis w/ dexamethasone, location:                                               []  take home patch       []  in clinic   PD min  unbilled []  Ice     []  Heat    location/position:     min []  Paraffin,  details:     min []  Vasopneumatic Device, press/temp:     min []  Whirlpool / Fluido:    If using vaso (only need to measure limb vaso being performed on)      pre-treatment girth :       post-treatment girth :       measured at (landmark location) :      min []  Other:    Skin assessment post-treatment (if applicable):    []  intact    []  redness- no adverse reaction                 []redness - adverse reaction:      Vasopnuematic compression justification:  Per bilateral girth measures taken and listed above the edema is considered significant and

## 2024-06-19 ENCOUNTER — APPOINTMENT (OUTPATIENT)
Facility: HOSPITAL | Age: 79
End: 2024-06-19
Payer: MEDICARE

## 2024-06-24 ENCOUNTER — HOSPITAL ENCOUNTER (OUTPATIENT)
Facility: HOSPITAL | Age: 79
Setting detail: RECURRING SERIES
Discharge: HOME | End: 2024-06-27
Payer: MEDICARE

## 2024-06-24 PROCEDURE — 97112 NEUROMUSCULAR REEDUCATION: CPT

## 2024-06-24 PROCEDURE — 97110 THERAPEUTIC EXERCISES: CPT

## 2024-06-24 NOTE — PROGRESS NOTES
PHYSICAL / OCCUPATIONAL THERAPY - DAILY TREATMENT NOTE (updated )    Patient Name: Michael Hernandez    Date: 2024    : 1945  Insurance: Payor: MEDICARE / Plan: MEDICARE PART A AND B / Product Type: *No Product type* /      Patient  verified yes     Visit #   Current / Total 23 24   Time   In / Out 2:41 3:20   Total Treatment Time 39   Pain   In / Out 0-1/10 0-1/10   Subjective Functional Status/Changes: I had an injection into my Left sacroiliac joint. I felt relief within 3-4 hours. My low back really isn't hurting now, I just feel it in my hip.      NEXT PROGRESS NOTE DUE: 2024    TREATMENT AREA =  Other low back pain [M54.59]    OBJECTIVE    Modalities Rationale:     decrease pain and increase tissue extensibility to improve patient's ability to progress to PLOF and address remaining functional goals.     min [] Estim Unattended, type/location:                                      []  w/ice    []  w/heat    min [] Estim Attended, type/location:                                     []  w/US     []  w/ice    []  w/heat    []  TENS insruct      min []  Mechanical Traction: type/lbs                   []  pro   []  sup   []  int   []  cont    []  before manual    []  after manual    min []  Ultrasound, settings/location:      min []  Iontophoresis w/ dexamethasone, location:                                               []  take home patch       []  in clinic    min  unbilled []  Ice     []  Heat    location/position:     min []  Paraffin,  details:     min []  Vasopneumatic Device, press/temp:     min []  Whirlpool / Fluido:    If using vaso (only need to measure limb vaso being performed on)      pre-treatment girth :       post-treatment girth :       measured at (landmark location) :      min []  Other:    Skin assessment post-treatment (if applicable):    []  intact    []  redness- no adverse reaction                 []redness - adverse reaction:      Vasopnuematic compression

## 2024-06-26 ENCOUNTER — HOSPITAL ENCOUNTER (OUTPATIENT)
Facility: HOSPITAL | Age: 79
Setting detail: RECURRING SERIES
Discharge: HOME | End: 2024-06-29
Payer: MEDICARE

## 2024-06-26 PROCEDURE — 97110 THERAPEUTIC EXERCISES: CPT

## 2024-06-26 PROCEDURE — 97530 THERAPEUTIC ACTIVITIES: CPT

## 2024-06-26 NOTE — PROGRESS NOTES
[facility]  [address], [place] [address], Phone [phone], fax [phone]  CONTINUED PLAN OF CARE/RECERTIFICATION FOR PHYSICAL THERAPY          Patient Name: Michael Hernandez : 1945   Treatment/Medical Diagnosis: Other low back pain [M54.59]   Onset Date: Worsening over past year before starting PT    Referral Source: Ney Guevara DO Start of Care (SOC): 2024   Prior Hospitalization: See Medical History Provider #: [id number]   Prior Level of Function: Functionally independent, lives with spouse and three dogs, retired practice manager, active    Comorbidities: allergies, arthritis, back pain, congestive heart failure/heart disease, high blood pressure, kidney stones, spasmodic torticollis    Visits from SOC: 24 Missed Visits: 0     Progress to Goals:  Patient will improve Oswestry score to 4/50 pts or less in order to indicate improved functional abilities.   Status at PN (2024): 14/50 pts  Current status: met, 3/50 pts 2024     2.  Patient will improve bilateral knee extension and hip strength to 5/5 throughout in order to reduce low back strain with transfers and mobility.   Status at IE knee extension 4+/5 bilaterally, flexion Right 4+/5, Left 5/5; Abduction Right 4/5, Left 4/5; Extension Right 4/5, Left 4/5 with pain; External rotation Right 5/5, Left 5/5; Internal rotation Right 4+/5, Left 5/5  Status at PN (2024): knee extension Right 5/5 Left 5/5; hip: flexion Right 5/5 Left 5/5, abduction Right 4+/5 Left 5/5, extension Right 4+/5 Left 4+/5, external rotation Right 5/5 Left 4+/5, internal rotation Right 4+/5 Left 5/5   Current status: progressing, knee extension 5/5, hip flexion 5/5 bilaterally; abduction Right 5/5 Left 4+/5, extension Right 4/5 Left 4/5, adduction 5/5 bilaterally 2024     3.  Patient will report worst low back, Left buttocks pain as 2/10 or less in order to improve standing and 
Reminder: bill using total billable min of TIMED therapeutic procedures (example: do not include dry needle or estim unattended, both untimed codes, in totals to left)  8-22 min = 1 unit; 23-37 min = 2 units; 38-52 min = 3 units; 53-67 min = 4 units; 68-82 min = 5 units   Total Total     [x]  Patient Education billed concurrently with other procedures   [x] Review HEP    [] Progressed/Changed HEP, detail:    [] Other detail:       Objective Information/Functional Measures/Assessment:  LE strength: knees 5/5; Hips: flexion 5/5 bilaterally; abduction Right 5/5 Left 4+/5, extension Right 4/5 Left 4/5, adduction 5/5 bilaterally  Oswestry: 3/50 (6%)  Functional Gains: good reduction in pain following recent an injection last week, improving knowledge of exercises/body awareness, minimal low back pain the past few days  Functional Deficits: Left hip pain, increasing trunk flexion posture throughout day (posture)  % improvement: 90%  Pain   Average: 1/10       Best: 0/10     Worst: 3/10  Patient Goal: \"to work on posture and continuing stretching\"     Assessment: Patient has consistently attended therapy over the past few months for low back and Left LE pain. We have focused on postural strengthening and LE stability. During therapy episode we have also focused on manual interventions to treat muscular pain and tension, as well as two sessions of dry needling. Last week Patient received an injection in his Left SI joint, and notes that since then he has had minimal lower back pain. He does continue to have Left hip pain, but radicular symptoms have improved. Some lingering hip weakness. Due to good improvements we will place Patient on a 30 day hold.    Patient will continue to benefit from skilled PT / OT services to modify and progress therapeutic interventions, analyze and address functional mobility deficits, analyze and address ROM deficits, analyze and address strength deficits, analyze and address soft tissue